# Patient Record
Sex: FEMALE | Race: WHITE | Employment: FULL TIME | URBAN - METROPOLITAN AREA
[De-identification: names, ages, dates, MRNs, and addresses within clinical notes are randomized per-mention and may not be internally consistent; named-entity substitution may affect disease eponyms.]

---

## 2019-02-26 ENCOUNTER — APPOINTMENT (OUTPATIENT)
Dept: GENERAL RADIOLOGY | Age: 54
End: 2019-02-26
Attending: EMERGENCY MEDICINE
Payer: COMMERCIAL

## 2019-02-26 ENCOUNTER — APPOINTMENT (OUTPATIENT)
Dept: CT IMAGING | Age: 54
End: 2019-02-26
Attending: EMERGENCY MEDICINE
Payer: COMMERCIAL

## 2019-02-26 ENCOUNTER — HOSPITAL ENCOUNTER (EMERGENCY)
Age: 54
Discharge: HOME | End: 2019-02-26
Attending: EMERGENCY MEDICINE
Payer: COMMERCIAL

## 2019-02-26 VITALS
TEMPERATURE: 98.7 F | HEART RATE: 64 BPM | SYSTOLIC BLOOD PRESSURE: 177 MMHG | OXYGEN SATURATION: 100 % | RESPIRATION RATE: 18 BRPM | DIASTOLIC BLOOD PRESSURE: 86 MMHG

## 2019-02-26 DIAGNOSIS — S20.221A CONTUSION OF RIGHT BACK WALL OF THORAX, INITIAL ENCOUNTER: ICD-10-CM

## 2019-02-26 DIAGNOSIS — S09.90XA INJURY OF HEAD, INITIAL ENCOUNTER: Primary | ICD-10-CM

## 2019-02-26 DIAGNOSIS — S42.035A NONDISPLACED FRACTURE OF LATERAL END OF LEFT CLAVICLE, INITIAL ENCOUNTER FOR CLOSED FRACTURE: ICD-10-CM

## 2019-02-26 DIAGNOSIS — Z23 REQUIRES A BOOSTER TETANUS: ICD-10-CM

## 2019-02-26 DIAGNOSIS — S40.012A CONTUSION OF LEFT SHOULDER, INITIAL ENCOUNTER: ICD-10-CM

## 2019-02-26 DIAGNOSIS — I10 HYPERTENSION, UNSPECIFIED TYPE: ICD-10-CM

## 2019-02-26 DIAGNOSIS — S01.01XA LACERATION OF SCALP, INITIAL ENCOUNTER: ICD-10-CM

## 2019-02-26 DIAGNOSIS — S50.01XA CONTUSION OF RIGHT ELBOW, INITIAL ENCOUNTER: ICD-10-CM

## 2019-02-26 PROCEDURE — 77030008460 HC STPLR SKN PRECIS 3M -A

## 2019-02-26 PROCEDURE — 74011250637 HC RX REV CODE- 250/637: Performed by: EMERGENCY MEDICINE

## 2019-02-26 PROCEDURE — 71045 X-RAY EXAM CHEST 1 VIEW: CPT

## 2019-02-26 PROCEDURE — 75810000293 HC SIMP/SUPERF WND  RPR

## 2019-02-26 PROCEDURE — 74011250636 HC RX REV CODE- 250/636: Performed by: EMERGENCY MEDICINE

## 2019-02-26 PROCEDURE — 90715 TDAP VACCINE 7 YRS/> IM: CPT | Performed by: EMERGENCY MEDICINE

## 2019-02-26 PROCEDURE — 90471 IMMUNIZATION ADMIN: CPT

## 2019-02-26 PROCEDURE — 72125 CT NECK SPINE W/O DYE: CPT

## 2019-02-26 PROCEDURE — 99285 EMERGENCY DEPT VISIT HI MDM: CPT

## 2019-02-26 PROCEDURE — 99284 EMERGENCY DEPT VISIT MOD MDM: CPT

## 2019-02-26 PROCEDURE — 77030018836 HC SOL IRR NACL ICUM -A

## 2019-02-26 PROCEDURE — 73030 X-RAY EXAM OF SHOULDER: CPT

## 2019-02-26 RX ORDER — VALSARTAN 40 MG/1
40 TABLET ORAL DAILY
Qty: 10 TAB | Refills: 0 | Status: SHIPPED | OUTPATIENT
Start: 2019-02-26 | End: 2019-03-07

## 2019-02-26 RX ORDER — ACETAMINOPHEN 500 MG
1000 TABLET ORAL
Status: COMPLETED | OUTPATIENT
Start: 2019-02-26 | End: 2019-02-26

## 2019-02-26 RX ORDER — METOPROLOL TARTRATE 25 MG/1
12.5 TABLET, FILM COATED ORAL 2 TIMES DAILY
Qty: 20 TAB | Refills: 0 | Status: SHIPPED | OUTPATIENT
Start: 2019-02-26 | End: 2019-03-21 | Stop reason: SDUPTHER

## 2019-02-26 RX ORDER — ACETAMINOPHEN 325 MG/1
650 TABLET ORAL
Qty: 20 TAB | Refills: 0 | Status: SHIPPED | OUTPATIENT
Start: 2019-02-26 | End: 2019-03-18

## 2019-02-26 RX ORDER — CYCLOBENZAPRINE HCL 5 MG
5 TABLET ORAL
Qty: 12 TAB | Refills: 0 | Status: SHIPPED | OUTPATIENT
Start: 2019-02-26 | End: 2019-03-10

## 2019-02-26 RX ORDER — IBUPROFEN 600 MG/1
600 TABLET ORAL
Status: COMPLETED | OUTPATIENT
Start: 2019-02-26 | End: 2019-02-26

## 2019-02-26 RX ORDER — IBUPROFEN 600 MG/1
600 TABLET ORAL
Qty: 20 TAB | Refills: 0 | Status: SHIPPED | OUTPATIENT
Start: 2019-02-26 | End: 2019-05-22

## 2019-02-26 RX ADMIN — IBUPROFEN 600 MG: 600 TABLET, FILM COATED ORAL at 08:58

## 2019-02-26 RX ADMIN — ACETAMINOPHEN 1000 MG: 500 TABLET, FILM COATED ORAL at 08:58

## 2019-02-26 RX ADMIN — TETANUS TOXOID, REDUCED DIPHTHERIA TOXOID AND ACELLULAR PERTUSSIS VACCINE, ADSORBED 0.5 ML: 5; 2.5; 8; 8; 2.5 SUSPENSION INTRAMUSCULAR at 08:11

## 2019-02-26 NOTE — DISCHARGE INSTRUCTIONS
Patient Education        Cuts: Care Instructions  Your Care Instructions  A cut can happen anywhere on your body. Stitches, staples, skin adhesives, or pieces of tape called Steri-Strips are sometimes used to keep the edges of a cut together and help it heal. Steri-Strips can be used by themselves or with stitches or staples. Sometimes cuts are left open. If the cut went deep and through the skin, the doctor may have closed the cut in two layers. A deeper layer of stitches brings the deep part of the cut together. These stitches will dissolve and don't need to be removed. The upper layer closure, which could be stitches, staples, Steri-Strips, or adhesive, is what you see on the cut. A cut is often covered by a bandage. The doctor has checked you carefully, but problems can develop later. If you notice any problems or new symptoms, get medical treatment right away. Follow-up care is a key part of your treatment and safety. Be sure to make and go to all appointments, and call your doctor if you are having problems. It's also a good idea to know your test results and keep a list of the medicines you take. How can you care for yourself at home? If a cut is open or closed  · Prop up the sore area on a pillow anytime you sit or lie down during the next 3 days. Try to keep it above the level of your heart. This will help reduce swelling. · Keep the cut dry for the first 24 to 48 hours. After this, you can shower if your doctor okays it. Pat the cut dry. · Don't soak the cut, such as in a bathtub. Your doctor will tell you when it's safe to get the cut wet. · After the first 24 to 48 hours, clean the cut with soap and water 2 times a day unless your doctor gives you different instructions. ? Don't use hydrogen peroxide or alcohol, which can slow healing. ? You may cover the cut with a thin layer of petroleum jelly and a nonstick bandage.   ? If the doctor put a bandage over the cut, put on a new bandage after cleaning the cut or if the bandage gets wet or dirty. · Avoid any activity that could cause your cut to reopen. · Be safe with medicines. Read and follow all instructions on the label. ? If the doctor gave you a prescription medicine for pain, take it as prescribed. ? If you are not taking a prescription pain medicine, ask your doctor if you can take an over-the-counter medicine. If the cut is closed with stitches, staples, or Steri-Strips  · Follow the above instructions for open or closed cuts. · Do not remove the stitches or staples on your own. Your doctor will tell you when to come back to have the stitches or staples removed. · Leave Steri-Strips on until they fall off. If the cut is closed with a skin adhesive  · Follow the above instructions for open or closed cuts. · Leave the skin adhesive on your skin until it falls off on its own. This may take 5 to 10 days. · Do not scratch, rub, or pick at the adhesive. · Do not put the sticky part of a bandage directly on the adhesive. · Do not put any kind of ointment, cream, or lotion over the area. This can make the adhesive fall off too soon. Do not use hydrogen peroxide or alcohol, which can slow healing. When should you call for help? Call your doctor now or seek immediate medical care if:    · You have new pain, or your pain gets worse.     · The skin near the cut is cold or pale or changes color.     · You have tingling, weakness, or numbness near the cut.     · The cut starts to bleed, and blood soaks through the bandage. Oozing small amounts of blood is normal.     · You have trouble moving the area near the cut.     · You have symptoms of infection, such as:  ? Increased pain, swelling, warmth, or redness around the cut.  ? Red streaks leading from the cut.  ? Pus draining from the cut.  ? A fever.    Watch closely for changes in your health, and be sure to contact your doctor if:    · The cut reopens.     · You do not get better as expected. Where can you learn more? Go to http://david-inna.info/. Enter M735 in the search box to learn more about \"Cuts: Care Instructions. \"  Current as of: September 23, 2018  Content Version: 11.9  © 1581-6646 [employer]. Care instructions adapted under license by [employer] (which disclaims liability or warranty for this information). If you have questions about a medical condition or this instruction, always ask your healthcare professional. [place] [address] any warranty or liability for your use of this information. Patient Education        Cuts Closed With Staples: Care Instructions  Your Care Instructions  A cut can happen anywhere on your body. The doctor used staples to close the cut. Staples easily and quickly close a cut, which helps the cut heal.  Sometimes a cut can injure tendons, blood vessels, or nerves. If the cut went deep and through the skin, the doctor may have put in a layer of stitches below the staples. The deeper layer of stitches brings the deep part of the cut together. These stitches will dissolve and don't need to be removed. The staples in the upper layer are what you see on the cut. You may have a bandage. You will need to have the staples removed, usually in 7 to 14 days. The doctor has checked you carefully, but problems can develop later. If you notice any problems or new symptoms, get medical treatment right away. Follow-up care is a key part of your treatment and safety. Be sure to make and go to all appointments, and call your doctor if you are having problems. It's also a good idea to know your test results and keep a list of the medicines you take. How can you care for yourself at home? · Keep the cut dry for the first 24 to 48 hours. After this, you can shower if your doctor okays it. Pat the cut dry. · Don't soak the cut, such as in a bathtub.  Your doctor will tell you when it's safe to get the cut wet.  · If your doctor told you how to care for your cut, follow your doctor's instructions. If you did not get instructions, follow this general advice:  ? After the first 24 to 48 hours, wash around the cut with clean water 2 times a day. Don't use hydrogen peroxide or alcohol, which can slow healing. ? You may cover the cut with a thin layer of petroleum jelly, such as Vaseline, and a nonstick bandage. ? Apply more petroleum jelly and replace the bandage as needed. · Avoid any activity that could cause your cut to reopen. · Do not remove the staples on your own. Your doctor will tell you when to come back to have the staples removed. · Take pain medicines exactly as directed. ? If the doctor gave you a prescription medicine for pain, take it as prescribed. ? If you are not taking a prescription pain medicine, ask your doctor if you can take an over-the-counter medicine. When should you call for help? Call your doctor now or seek immediate medical care if:    · You have new pain, or your pain gets worse.     · The skin near the cut is cold or pale or changes color.     · You have tingling, weakness, or numbness near the cut.     · The cut starts to bleed, and blood soaks through the bandage. Oozing small amounts of blood is normal.     · You have trouble moving the area near the cut.     · You have symptoms of infection, such as:  ? Increased pain, swelling, warmth, or redness around the cut.  ? Red streaks leading from the cut.  ? Pus draining from the cut.  ? A fever.    Watch closely for changes in your health, and be sure to contact your doctor if:    · You do not get better as expected. Where can you learn more? Go to http://david-inna.info/. Enter G958 in the search box to learn more about \"Cuts Closed With Staples: Care Instructions. \"  Current as of: September 23, 2018  Content Version: 11.9  © 0486-2820 [employer].  Care instructions adapted under license by [employer] (which disclaims liability or warranty for this information). If you have questions about a medical condition or this instruction, always ask your healthcare professional. [address] any warranty or liability for your use of this information.

## 2019-02-26 NOTE — ED PROVIDER NOTES
EMERGENCY DEPARTMENT HISTORY AND PHYSICAL EXAM 
 
7:04 AM 
 
 
Date: 2/26/2019 Patient Name: Andi Crystal History of Presenting Illness Chief Complaint Patient presents with  Laceration  Shoulder Pain History Provided By: Patient Additional History (Context): Andi Crystal is a 52 y.o. female with hypertension who presents per EMS with head laceration occurring minutes PTA. Pt reports a heavy, metal wall locker fell on her at work, causing head and shoulder injury. Associated sx include L shoulder pain, back pain. Pt denies LOC, numbness, weakness. Pt unsure of last tetanus. /91 with hx HTN. PCP: None Chief Complaint: Wound Duration:  Minutes Timing:  Acute Location: Head Quality: laceration Severity: N/A as complaint is not pain Modifying Factors: none reported Associated Symptoms: shoulder pain Past History Past Medical History: 
Past Medical History:  
Diagnosis Date  Hypertension  Kidney calculus Past Surgical History: 
History reviewed. No pertinent surgical history. Family History: 
History reviewed. No pertinent family history. Social History: 
Social History Tobacco Use  Smoking status: Never Smoker  Smokeless tobacco: Never Used Substance Use Topics  Alcohol use: No  
  Frequency: Never  Drug use: No  
 
 
Allergies: 
No Known Allergies Review of Systems Review of Systems Constitutional: Negative for activity change, fatigue and fever. HENT: Negative for congestion and rhinorrhea. Eyes: Negative for visual disturbance. Respiratory: Negative for shortness of breath. Cardiovascular: Negative for chest pain and palpitations. Gastrointestinal: Negative for abdominal pain, diarrhea, nausea and vomiting. Genitourinary: Negative for dysuria and hematuria. Musculoskeletal: Positive for arthralgias (shoulder) and back pain. Skin: Positive for wound. Negative for rash. Neurological: Negative for dizziness, syncope, weakness, light-headedness and numbness. Psychiatric/Behavioral: Negative for agitation. All other systems reviewed and are negative. Physical Exam  
 
Visit Vitals /86 Pulse 64 Temp 98.7 °F (37.1 °C) Resp 18 SpO2 100% Physical Exam  
Constitutional: She appears well-developed and well-nourished. No distress. HENT:  
Head: Normocephalic and atraumatic. Right Ear: External ear normal.  
Left Ear: External ear normal.  
Nose: Nose normal.  
Mouth/Throat: Oropharynx is clear and moist.  
Eyes: Conjunctivae and EOM are normal. Pupils are equal, round, and reactive to light. No scleral icterus. Neck: Normal range of motion. Neck supple. No JVD present. No tracheal deviation present. No thyromegaly present. Cardiovascular: Normal rate and regular rhythm. Exam reveals no friction rub. No murmur heard. Nml cap refill Pulmonary/Chest: Effort normal and breath sounds normal. No stridor. She exhibits no tenderness. Abdominal: Soft. Bowel sounds are normal. She exhibits no distension. There is no tenderness. There is no rebound and no guarding. Musculoskeletal: Normal range of motion. She exhibits no edema or tenderness. No R elbow bony ttp, full ROM, nml cap refill, sensory motor intact LE full ROM, no ttp, motor sensory intact Proximal lateral shoulder ttp, no deformity or step off Lower thorax paraspinal ttp Lymphadenopathy:  
  She has no cervical adenopathy. Neurological: She is alert. She has normal strength. No cranial nerve deficit or sensory deficit. Coordination normal.  
Skin: Skin is warm and dry. R elbow 1 cm abrasion minimal ecchymosis L vertex laceration approx 5cm with no foreign body or active bleeding Psychiatric: She has a normal mood and affect. Her behavior is normal. Judgment and thought content normal.  
Nursing note and vitals reviewed. Diagnostic Study Results Labs - 
 No results found for this or any previous visit (from the past 12 hour(s)). Radiologic Studies -  
CT SPINE CERV WO CONT Final Result IMPRESSION:  No acute osseous injury There is a soft tissue density mass noted inferior to the left thyroid lobe  
which is displacing the trachea to the right. This could be a thyroid mass  
versus adenopathy. Initial workup with thyroid sonography as well as CT of  
either the neck and/or chest would be helpful. XR SHOULDER LT AP/LAT MIN 2 V    As read by Alma Lara MD There is a distal L clavicular fx by [facility] joint. No obvious AC separation. XR CHEST PORT    As read by Alma Lara MD negative for any acute rib fx. Medical Decision Making It should be noted that Candelaria Winslow MD will be the provider of record for this patient. I reviewed the vital signs, available nursing notes, past medical history, past surgical history, family history and social history. Vital Signs-Reviewed the patient's vital signs. Pulse Oximetry Analysis -  99% on room air (Interpretation) wnl 
 
Cardiac Monitor: 
Rate: 64 Records Reviewed: Nursing Notes (Time of Review: 7:04 AM) ED Course: Progress Notes, Reevaluation, and Consults: 
 
10:10 Re-evaluated pt after x-ray, pt is point tender to the L clavicle. Pt. Is fully aware of would check in 2 days and staple removal in 7 days. Pt is fully aware of when to return to ED and also needs close followup. 10:13 Pt also requested home BP medication that she has been out of. Understands to f/u with her PCP, she has 2 different meds that I will prescribe but only for 10 days. Wound Repair 
Date/Time: 2/26/2019 10:01 AM 
Performed by: studentPreparation: skin prepped with Betadine Location details: scalp Wound length:2.6 - 7.5 cm Anesthesia: 
Local Anesthetic: lidocaine 1% without epinephrine Anesthetic total: 4 mL Foreign bodies: no foreign bodies Irrigation solution: saline Irrigation method: syringe Debridement: none Skin closure: staples Number of sutures: 6 (staples) Approximation: close Dressing: antibiotic ointment and 4x4 Patient tolerance: Patient tolerated the procedure well with no immediate complications My total time at bedside, performing this procedure was 1-15 minutes. Provider Notes (Medical Decision Making): Patient presents to emergency department with acute trauma with heavy object to her scalp. No LOC patient did fall down to the mechanism but remembers everything. There is a scalp laceration although there is no significant bruising or contusion around it is no significant active bleeding and there is no foreign body appreciated. Patient also has minimal cervical spine tenderness but due to high be object and the mechanism I will perform CT scan off spine. There is also left shoulder contusion although patient is able to abductor and adductor without problem Patient has full range of motion in her right elbow although there is small bruising present and appreciated. There is no neurological deficits vascular flow is not compromised in any of the extremities. There is some mild right lower thoracic tenderness that is paraspinal.   
Patient did not want any pain medications agrees with all the plan I will get cervical/left shoulder/chest x-ray to look for any acute injuries. If negative symptomatic treatment Follow-up in 48 hours for scalp laceration. It will be irrigated as well and then stapled. Staple removal 7-8 days based on patient's healing. Diagnosis Clinical Impression:  
1. Injury of head, initial encounter 2. Laceration of scalp, initial encounter 3. Contusion of right elbow, initial encounter 4. Contusion of left shoulder, initial encounter 5. Contusion of right back wall of thorax, initial encounter 6. Requires a booster tetanus 7.  Nondisplaced fracture of lateral end of left clavicle, initial encounter for closed fracture 8. Hypertension, unspecified type Disposition: Discharge Follow-up Information Follow up With Specialties Details Why Contact [facility] EMERGENCY DEPT Emergency Medicine  If symptoms worsen [address] 
[phone] Mark Patel MD Orthopedic Surgery Call in 1 day Follow Up From Emergency Department [address] [facility] [address] 
[phone] [address] Call in 1 day Follow Up From Emergency Department [facility] [facility] [address]) [address] [phone] Medication List  
  
START taking these medications   
acetaminophen 325 mg tablet Commonly known as:  TYLENOL Take 2 Tabs by mouth every four (4) hours as needed for Pain for up to 20 days. cyclobenzaprine 5 mg tablet Commonly known as:  FLEXERIL Take 1 Tab by mouth three (3) times daily as needed for Muscle Spasm(s) for up to 12 days. ibuprofen 600 mg tablet Commonly known as:  MOTRIN Take 1 Tab by mouth every six (6) hours as needed for Pain. 
  
metoprolol tartrate 25 mg tablet Commonly known as:  LOPRESSOR Take 0.5 Tabs by mouth two (2) times a day. valsartan 40 mg tablet Commonly known as:  DIOVAN Take 1 Tab by mouth daily for 10 days. Where to Get Your Medications Information about where to get these medications is not yet available Ask your nurse or doctor about these medications · acetaminophen 325 mg tablet · cyclobenzaprine 5 mg tablet · ibuprofen 600 mg tablet · metoprolol tartrate 25 mg tablet · valsartan 40 mg tablet 
  
 
_______________________________ Scribe Attestation Joseph Yoon acting as a scribe for and in the presence of Rudy Mejia MD     
February 26, 2019 at 10:56 AM 
    
Provider Attestation:     
I personally performed the services described in the documentation, reviewed the documentation, as recorded by the scribe in my presence, and it accurately and completely records my words and actions. February 26, 2019 at 10:56 AM - Benjamin Winter MD   
 
 
_______________________________

## 2019-02-26 NOTE — LETTER
NOTIFICATION RETURN TO WORK / SCHOOL 
 
2/26/2019 10:22 AM 
 
Ms. Ulysses Chamberlain [address] [facility]. [address] [facility] [address] [id number] To Whom It May Concern: 
 
Ulysses Chamberlain is currently under the care of [facility] EMERGENCY DEPT. She will return to work/school on: 2/28/2019 If there are questions or concerns please have the patient contact our office. Sincerely, Chasidy Kaufman MD

## 2019-02-26 NOTE — ED TRIAGE NOTES
Patient comes in by medics for complaints of a locker falling on her, laceration to top of head and complaints of L shoulder pain, denies LOC.

## 2019-03-07 ENCOUNTER — OFFICE VISIT (OUTPATIENT)
Dept: INTERNAL MEDICINE CLINIC | Age: 54
End: 2019-03-07

## 2019-03-07 VITALS
SYSTOLIC BLOOD PRESSURE: 140 MMHG | HEART RATE: 68 BPM | HEIGHT: 65 IN | BODY MASS INDEX: 27.96 KG/M2 | WEIGHT: 167.8 LBS | RESPIRATION RATE: 18 BRPM | TEMPERATURE: 95.6 F | OXYGEN SATURATION: 95 % | DIASTOLIC BLOOD PRESSURE: 80 MMHG

## 2019-03-07 DIAGNOSIS — I10 BENIGN HYPERTENSION WITHOUT CHF: ICD-10-CM
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
## 2019-03-07 DIAGNOSIS — Z00.00 ROUTINE GENERAL MEDICAL EXAMINATION AT A HEALTH CARE FACILITY: ICD-10-CM

## 2019-03-07 RX ORDER — TRAMADOL HYDROCHLORIDE 50 MG/1
TABLET ORAL
Refills: 0 | COMMUNITY
Start: 2019-03-05 | End: 2019-03-21

## 2019-03-07 RX ORDER — HYDROCHLOROTHIAZIDE 25 MG/1
25 TABLET ORAL DAILY
Qty: 30 TAB | Refills: 3 | Status: SHIPPED | OUTPATIENT
Start: 2019-03-07 | End: 2019-03-21 | Stop reason: SDUPTHER

## 2019-03-07 NOTE — PROGRESS NOTES
Chief Complaint   Patient presents with    Establish Care    Head Injury     approx 5-6 staples to head, 1 wk old. HPI:     Yoel Fletcher is a 52 y.o.  female with history of hypertension and GERD  Here for the above complaint. Pt went to [facility] ER on 2/26/19 for fall. She had wall lockers fall on her left shoulder has she was putting shoes on. She had a laceration on top of head on the left and fracture of clavicle on left. She denies any chest pain, shortness of breath, abdominal pain, headaches or dizziness. 6 staples were placed. ER wanted her to be removed after 7 days. ER records were reviewed. Result Information     Status: Final result (Exam End: 2/26/2019 07:53) Provider Status: Open   Study Result     EXAM: CT SPINE CERV WO CONT     INDICATION: C-spine trauma, NEXUS/CCR positive     COMPARISON: Status post head injury     TECHNIQUE:  Unenhanced multislice helical CT of the cervical spine was performed  in the axial plane. Sagittal and coronal reconstructions were obtained. CT  dose reduction was achieved through use of a standardized protocol tailored for  this examination and automatic exposure control for dose modulation.      FINDINGS:     The prevertebral body heights are preserved. The vertebra appear in normal  alignment. Anterior disc osteophytes are present at C5-6 and C6-7. Left-sided  facet arthropathy is seen at C3-4 and C4-5. Right-sided facet arthropathy is  seen predominantly at C4-5.      There is a soft tissue density structure noted inferior to the left thyroid lobe  which appears to represent either a thyroid mass or area of adenopathy.  This  structure measures 3.1 x 3.3 x 2.2 cm     C2-C3:  The spinal canal and neural foramina are widely patent.     C3-C4:  The spinal canal and neural foramina are widely patent.     C4-C5:  The spinal canal and neural foramina are widely patent.     C5-C6:  The spinal canal and neural foramina are widely patent.     C6-C7: The spinal canal and neural foramina are widely patent. C7-T1:  The spinal canal and neural foramina are widely patent.     IMPRESSION  IMPRESSION:  No acute osseous injury       There is a soft tissue density mass noted inferior to the left thyroid lobe  which is displacing the trachea to the right. This could be a thyroid mass  versus adenopathy. Initial workup with thyroid sonography as well as CT of  either the neck and/or chest would be helpful. Result Information     Status: Final result (Exam End: 2/26/2019 08:43) Provider Status: Reviewed   Study Result     EXAM:  LEFT SHOULDER:     CLINICAL INDICATION/HISTORY: Left shoulder pain.    > Additional: None.     COMPARISON: None. > Reference Exam: None.     TECHNIQUE: 4 views.     _____________________     FINDINGS:       Distal left clavicular fracture. No significant displacement or angulation. No  lateral humeral dislocation. No acromioclavicular separation.     _____________________     IMPRESSION  IMPRESSION:     Nondisplaced distal left clavicular fracture. Status: Final result (Exam End: 2/26/2019 08:42) Provider Status: Reviewed   Study Result     EXAM:  PORTABLE CHEST     INDICATION: Trauma. Chest pain.     TECHNIQUE:  Portable, erect AP view.     COMPARISON:  None.     ____________________     FINDINGS:       SUPPORT DEVICES: None.     HEART AND MEDIASTINUM: Cardiomediastinal silhouette appears within normal  limits. Normal caliber thoracic aorta.     LUNGS AND PLEURAL SPACES: Lungs are hypoinflated. No convincing focal  consolidation. Although suggestion of retrocardiac opacity, not confirmed on  accompanying left shoulder series. No pulmonary edema or pneumothorax.     BONY THORAX AND SOFT TISSUES: Distal left clavicle fracture, more apparent on  accompanying left shoulder series.     ____________________     IMPRESSION  IMPRESSION:     1. Hypoinflation without convincing acute cardiopulmonary disease.     2.  Nondisplaced distal left clavicular fracture, more apparent on accompanying  left shoulder series. She said she was unaware about the mass below thyroid. Past Medical History:   Diagnosis Date    Anemia     Exposure to TB 1989    positive PPD c prophylaxis tx    GERD (gastroesophageal reflux disease)     Hypertension     Kidney calculus 2014    Positive PPD 1989    c prophylactic tx       History reviewed. No pertinent surgical history. MEDICATION ALLERGIES/INTOLERANCES:   No Known Allergies          CURRENT MEDICATIONS:    Current Outpatient Medications   Medication Sig    traMADol (ULTRAM) 50 mg tablet TK 1 TABLET PO Q 6 H PRF PAIN    hydroCHLOROthiazide (HYDRODIURIL) 25 mg tablet Take 1 Tab by mouth daily.  ibuprofen (MOTRIN) 600 mg tablet Take 1 Tab by mouth every six (6) hours as needed for Pain.  acetaminophen (TYLENOL) 325 mg tablet Take 2 Tabs by mouth every four (4) hours as needed for Pain for up to 20 days.  cyclobenzaprine (FLEXERIL) 5 mg tablet Take 1 Tab by mouth three (3) times daily as needed for Muscle Spasm(s) for up to 12 days.  metoprolol tartrate (LOPRESSOR) 25 mg tablet Take 0.5 Tabs by mouth two (2) times a day. (Patient taking differently: Take 25 mg by mouth daily.)     No current facility-administered medications for this visit.         Health Maintenance   Topic Date Due    Hepatitis C Screening  1965    PAP AKA CERVICAL CYTOLOGY  04/15/1984    Shingrix Vaccine Age 50> (1 of 2) 04/15/2013    BREAST CANCER SCRN MAMMOGRAM  04/15/2013    FOBT Q 1 YEAR AGE 50-75  04/15/2013    Influenza Age 9 to Adult  09/01/2019 (Originally 8/1/2018)    DTaP/Tdap/Td series (2 - Td) 02/26/2029         FAMILY HISTORY:   Family History   Problem Relation Age of Onset    Liver Disease Father         cirrhosis r/t etoh    Alcohol abuse Father     No Known Problems Sister     No Known Problems Brother     Cancer Maternal Aunt         lung ca    Cancer Maternal Uncle         lung ca   Basim Jack Cancer Paternal Grandfather         lung ca    No Known Problems Sister     No Known Problems Brother        SOCIAL HISTORY:   She  reports that  has never smoked. she has never used smokeless tobacco.  She  reports that she drinks alcohol. OBJECTIVE:  PHYSICAL EXAM: Vitals:   Vitals:    03/07/19 1332 03/07/19 1355 03/07/19 1420   BP: (!) 182/92 (!) 186/113 140/80   Pulse: 68     Resp: 18     Temp: 95.6 °F (35.3 °C)     TempSrc: Oral     SpO2: 95%     Weight: 167 lb 12.8 oz (76.1 kg)     Height: 5' 5\" (1.651 m)       Generally: Pleasant female in no acute distress    HEENT exam: Head: atraumatic : 6 stitches on left side of head. Area looks well healed and scabbed over. Pulmonary exam: Clear to ausculation bilaterally    Abdominal exam: Positive bowel sounds in all four quadrants, soft, nondistended, nontender. No hepatosplenomegaly. Extremities: 2+ dorsalis pedis bilaterally. No pedal edema bilaterally. 6 staples removed on left side of head. 4 cm scar. LABS/RADIOLOGICAL TESTS:   No results found for: WBC, HGB, HCT, PLT, HGBEXT, HCTEXT, PLTEXT, HGBEXT, HCTEXT, PLTEXT  No results found for: NA, K, CL, CO2, GLU, BUN, CREA  No results found for: CHOL, CHOLX, CHLST, CHOLV, HDL, LDL, LDLC, DLDLP, TGLX, TRIGL, TRIGP  No results found for: GPT    All lab results and radiological studies were discussed and reviewed with the patient. ASSESSMENT/PLAN:  1. Fall initial encounter: resolved. She has some swelling still on right side of head. No TTP. She has some neck pain. No TTP in the neck area and she was told to use heating pad, icy/hot, tiger balm for pain. ICD-10-CM ICD-9-CM    1. Benign hypertension without CHF I10 401.1 Continue the lopressor. Stop diovan as this is on recall. Will increase to HCTZ 25mg one po daily. Increase high potassium foods while on the medication. hydroCHLOROthiazide (HYDRODIURIL) 25 mg tablet   2.  Mass R22.9 782.2 US THYROID/PARATHYROID/SOFT TISS CT NECK SOFT TISSUE W WO CONT   3. Routine general medical examination at a health care facility Z00.00 V70.0 TSH 3RD GENERATION      CBC W/O DIFF      METABOLIC PANEL, COMPREHENSIVE      LIPID PANEL      HEMOGLOBIN A1C WITH EAG      URINALYSIS W/ RFLX MICROSCOPIC      VITAMIN D, 25 HYDROXY     4. Requested Prescriptions     Signed Prescriptions Disp Refills    hydroCHLOROthiazide (HYDRODIURIL) 25 mg tablet 30 Tab 3     Sig: Take 1 Tab by mouth daily. 5. Patient verbalized understanding and agreement with the plan. 6. Patient was given after visit summary. 7. Follow-up Disposition:  Return in about 2 weeks (around 3/21/2019) for f/u HTN. or sooner if worsening symptoms.         Luis Odom MD

## 2019-03-07 NOTE — PROGRESS NOTES
ROOM # 1    Amanda Pierce presents today for   Chief Complaint   Patient presents with    Establish Care    Head Injury     approx 5-6 staples to head, 1 wk old. Amanda Pierce preferred language for health care discussion is english/other. Is someone accompanying this pt? no    Is the patient using any DME equipment during [address]? no    Depression Screening:  3 most recent PHQ Screens 3/7/2019   Little interest or pleasure in doing things Not at all   Feeling down, depressed, irritable, or hopeless Not at all   Total Score PHQ 2 0       Learning Assessment:  Learning Assessment 3/7/2019   PRIMARY LEARNER Patient   HIGHEST LEVEL OF EDUCATION - PRIMARY LEARNER  [id number] Mike Mullins PRIMARY LEARNER NONE   CO-LEARNER CAREGIVER No   PRIMARY LANGUAGE ENGLISH   LEARNER PREFERENCE PRIMARY READING   ANSWERED BY patient   RELATIONSHIP SELF       Abuse Screening:  Abuse Screening Questionnaire 3/7/2019   Do you ever feel afraid of your partner? N   Are you in a relationship with someone who physically or mentally threatens you? N   Is it safe for you to go home? Y       Fall Risk  No flowsheet data found. Health Maintenance reviewed and discussed per provider. Yes    Amanda Pierce is due for   Health Maintenance Due   Topic Date Due    Hepatitis C Screening  1965    PAP AKA CERVICAL CYTOLOGY  04/15/1984    Shingrix Vaccine Age 50> (1 of 2) 04/15/2013    BREAST CANCER SCRN MAMMOGRAM  04/15/2013    FOBT Q 1 YEAR AGE 50-75  04/15/2013   HM to be d/w provider   Pt. Needs referral to OBGYN for pap smear     Please order/place referral if appropriate. Advance Directive:  1. Do you have an advance directive in place? Patient Reply: no    2. If not, would you like material regarding how to put one in place? Patient Reply: yes, given    Coordination of Care:  1. Have you been to the ER, urgent care clinic since your last visit? Hospitalized since your last visit?  2/26/19 [facility] for head, shoulder and back injury    2. Have you seen or consulted any other health care providers outside of the [address] [place] [address] since your last visit? Include any pap smears or colon screening.  no

## 2019-03-07 NOTE — PATIENT INSTRUCTIONS
1) Increase high potassium foods while on HCTZ. 2) Follow-up in 2 weeks or sooner if worsening symptoms. 3) Stop the valsartan. Potassium-Rich Diet: Care Instructions  Your Care Instructions    Potassium is a mineral. It helps keep the right mix of fluids in your body. It also helps your nerves and muscles work as they should. You'll find it in milk and meats. It's also in all fresh foods, including fruits and vegetables. Most adults need about 5 grams of potassium a day. The foods you eat should supply all that you need. Some health conditions can cause a loss of potassium. For example, kidney problems and stomach problems with vomiting and diarrhea can cause you to lose this mineral. Some medicines, such as water pills (diuretics), can cause low potassium. If you can't get enough potassium from what you eat, your doctor may advise you to take supplements. Follow-up care is a key part of your treatment and safety. Be sure to make and go to all appointments, and call your doctor if you are having problems. It's also a good idea to know your test results and keep a list of the medicines you take. How can you care for yourself at home? · Plan your diet around foods that are rich in potassium. Fresh, unprocessed whole foods have the most. These foods include:  ? Milk and other dairy products. ? Vegetables, especially broccoli, cooked dry beans, tomatoes, potatoes, artichokes, winter squash, and spinach. ? Fruits, especially citrus fruits, bananas, and apricots. Dried apricots contain more potassium than fresh apricots. ? Meat, poultry, and fish. · Ask your doctor about using a salt substitute or \"light\" salt. These often contain potassium. Where can you learn more? Go to http://david-inna.info/. Enter H315 in the search box to learn more about \"Potassium-Rich Diet: Care Instructions. \"  Current as of: March 28, 2018  Content Version: 11.9  © 5099-9486 [employer]. Care instructions adapted under license by [employer] (which disclaims liability or warranty for this information). If you have questions about a medical condition or this instruction, always ask your healthcare professional. [address] any warranty or liability for your use of this information.

## 2019-03-12 ENCOUNTER — HOSPITAL ENCOUNTER (OUTPATIENT)
Dept: ULTRASOUND IMAGING | Age: 54
Discharge: HOME | End: 2019-03-12
Attending: INTERNAL MEDICINE
Payer: COMMERCIAL

## 2019-03-12 ENCOUNTER — HOSPITAL ENCOUNTER (OUTPATIENT)
Dept: CT IMAGING | Age: 54
Discharge: HOME | End: 2019-03-12
Attending: INTERNAL MEDICINE
Payer: COMMERCIAL

## 2019-03-12 LAB — CREAT UR-MCNC: 1.1 MG/DL (ref 0.6–1.3)

## 2019-03-12 PROCEDURE — 70491 CT SOFT TISSUE NECK W/DYE: CPT

## 2019-03-12 PROCEDURE — 76536 US EXAM OF HEAD AND NECK: CPT

## 2019-03-12 PROCEDURE — 74011636320 HC RX REV CODE- 636/320: Performed by: INTERNAL MEDICINE

## 2019-03-12 PROCEDURE — 82565 ASSAY OF CREATININE: CPT

## 2019-03-12 RX ADMIN — IOPAMIDOL 77 ML: 612 INJECTION, SOLUTION INTRAVENOUS at 17:58

## 2019-03-13 DIAGNOSIS — E07.9 THYROID MASS: Primary | ICD-10-CM

## 2019-03-13 NOTE — PROGRESS NOTES
Called pt again and went to . Left message again to call the office back at [phone] regarding her results.

## 2019-03-13 NOTE — PROGRESS NOTES
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 3) see result note for thyroid ultrasound    Called pt and left message on VM to call the office back at [phone] regarding her neck CT and thyroid ultrasound results.

## 2019-03-14 NOTE — PROGRESS NOTES
Incoming call from pt. 2 pt identifiers confirmed. Pt informed of below per Dr Andreea Cuevas. Pt verbalized understanding. No other questions or concerns at this time.

## 2019-03-16 ENCOUNTER — OFFICE VISIT (OUTPATIENT)
Dept: INTERNAL MEDICINE CLINIC | Age: 54
End: 2019-03-16

## 2019-03-16 VITALS
HEART RATE: 64 BPM | OXYGEN SATURATION: 99 % | RESPIRATION RATE: 16 BRPM | WEIGHT: 173 LBS | BODY MASS INDEX: 28.82 KG/M2 | TEMPERATURE: 97.6 F | DIASTOLIC BLOOD PRESSURE: 95 MMHG | HEIGHT: 65 IN | SYSTOLIC BLOOD PRESSURE: 144 MMHG

## 2019-03-16 DIAGNOSIS — J00 ACUTE RHINITIS: Primary | ICD-10-CM

## 2019-03-16 DIAGNOSIS — R09.81 CONGESTION OF NASAL SINUS: ICD-10-CM

## 2019-03-16 RX ORDER — VALSARTAN 40 MG/1
TABLET ORAL
Refills: 0 | COMMUNITY
Start: 2019-02-26 | End: 2019-03-21

## 2019-03-16 NOTE — PROGRESS NOTES
HISTORY OF PRESENT ILLNESS  Soraida Street is a 52 y.o. female. Sinus Pain    Associated symptoms include cough. Cough     Generalized Body Aches         Review of Systems   HENT: Positive for sinus pain. Respiratory: Positive for cough.         Physical Exam    ASSESSMENT and PLAN  {ASSESSMENT/PLAN:12495}

## 2019-03-16 NOTE — PATIENT INSTRUCTIONS
Saline Nasal Washes: Care Instructions  Your Care Instructions  Saline nasal washes help keep the nasal passages open by washing out thick or dried mucus. This simple remedy can help relieve symptoms of allergies, sinusitis, and colds. It also can make the nose feel more comfortable by keeping the mucous membranes moist. You may notice a little burning sensation in your nose the first few times you use the solution, but this usually gets better in a few days. Follow-up care is a key part of your treatment and safety. Be sure to make and go to all appointments, and call your doctor if you are having problems. It's also a good idea to know your test results and keep a list of the medicines you take. How can you care for yourself at home? · You can buy premixed saline solution in a squeeze bottle or other sinus rinse products at a drugstore. Read and follow the instructions on the label. · You also can make your own saline solution by adding 1 teaspoon of salt and 1 teaspoon of baking soda to 2 cups of distilled water. · If you use a homemade solution, pour a small amount into a clean bowl. Using a rubber bulb syringe, squeeze the syringe and place the tip in the salt water. Pull a small amount of the salt water into the syringe by relaxing your hand. · Sit down with your head tilted slightly back. Do not lie down. Put the tip of the bulb syringe or the squeeze bottle a little way into one of your nostrils. Gently drip or squirt a few drops into the nostril. Repeat with the other nostril. Some sneezing and gagging are normal at first.  · Gently blow your nose. · Wipe the syringe or bottle tip clean after each use. · Repeat this 2 or 3 times a day. · Use nasal washes gently if you have nosebleeds often. When should you call for help? Watch closely for changes in your health, and be sure to contact your doctor if:    · You often get nosebleeds.     · You have problems doing the nasal washes.    Where can you learn more? Go to http://david-inna.info/. Enter [id number] [id number] [id number] in the search box to learn more about \"Saline Nasal Washes: Care Instructions. \"  Current as of: March 27, 2018  Content Version: 11.9  © 5677-8768 [employer]. Care instructions adapted under license by [employer] (which disclaims liability or warranty for this information). If you have questions about a medical condition or this instruction, always ask your healthcare professional. [address] any warranty or liability for your use of this information.

## 2019-03-16 NOTE — PROGRESS NOTES
SUBJECTIVE:   HPI:  Nusrat Bravo is a 52 y.o. female who complains of cough, congestion, sneezing x 2 days. Reports feeling feverish/chills. No body aches. No N/V/D. Feels a little better over the last few hours. Patient reports using Tylenol, Corcidin, alkaselzer -D, Robitussin. No sick contacts. Recent travel to [place] (symptoms started there on Thursday). ROS:    · General: No fever, chills; no weight weight loss, no night sweats  · Respiratory: No cough, No shortness of breath, No wheezing  · Cardiovascular: No chest pain, No palpitations, No dyspnea on exertion  · Gastrointestinal: No nausea, no vomiting, no diarrhea, no constipation, no black or bloody stools  · Urinary: No dysuria, no urgency, no frequency, no blood, no discharge  · Musculoskeletal: no muscle weakness, no muscles pain  · Neurological: No dizziness, no headaches, no numbness/tingling of extremities    Past Medical History:   Diagnosis Date    Anemia     Exposure to TB 1989    positive PPD c prophylaxis tx    GERD (gastroesophageal reflux disease)     Hypertension     Kidney calculus 2014    Positive PPD 1989    c prophylactic tx     History reviewed. No pertinent surgical history. Outpatient Medications Marked as Taking for the 3/16/19 encounter (Office Visit) with Eladia Cardona, DO   Medication Sig Dispense Refill    traMADol (ULTRAM) 50 mg tablet TK 1 TABLET PO Q 6 H PRF PAIN  0    hydroCHLOROthiazide (HYDRODIURIL) 25 mg tablet Take 1 Tab by mouth daily. 30 Tab 3    ibuprofen (MOTRIN) 600 mg tablet Take 1 Tab by mouth every six (6) hours as needed for Pain. 20 Tab 0    acetaminophen (TYLENOL) 325 mg tablet Take 2 Tabs by mouth every four (4) hours as needed for Pain for up to 20 days. 20 Tab 0    metoprolol tartrate (LOPRESSOR) 25 mg tablet Take 0.5 Tabs by mouth two (2) times a day.  (Patient taking differently: Take 25 mg by mouth daily.) 20 Tab 0     No Known Allergies    OBJECTIVE:  Visit Vitals  BP (!) 144/95 (BP 1 Location: Right arm, BP Patient Position: Sitting)   Pulse 64   Temp 97.6 °F (36.4 °C) (Oral)   Resp 16   Ht 5' 5\" (1.651 m)   Wt 173 lb (78.5 kg)   SpO2 99%   BMI 28.79 kg/m²      GEN: awake, alert, orientated, in no acute distress  EARS: clear canals, TMs show no erythema  NOSE: clear drainage, +edema and pale,bogginess of turbinates  SINUSES: non-tender  THROAT: clear post nasal drip, no erythema, no exudate  NECK: No lymphadenopathy, normal appearing thyroid  CV:  The heart sounds are regular in rate and rhythm. There is a normal S1 and S2. There or no murmurs, rubs, or gallops. Distal pulses are intact and equal. No peripheral edema. LUNGS:  Lung sounds are clear and equal to auscultation throughout all lung fields. NEURO: CNII-XII grossly intact, normal balance, normal gait    ASSESSMENT/PLAN:     1. Acute rhinitis - suspect allergic as main cause. Symptomatic relief for now. Drink plenty of water to keep urine clear. Recommend daily saline sinus rinses. May use OTC decongestants, Flonase and pain relievers as needed. Return if symptoms worsen or do not improve. 2. Congestion of nasal sinus - same as above.

## 2019-03-16 NOTE — PROGRESS NOTES
ROOM # 18    Patricia Espinoza presents today for   Chief Complaint   Patient presents with    Sinus Pain    Cough    Generalized Body Aches       Patricia Espinoza preferred language for health care discussion is english/other. Is someone accompanying this pt? No    Is the patient using any DME equipment during OV? No    Depression Screening:  3 most recent PHQ Screens 3/16/2019 3/7/2019   Little interest or pleasure in doing things Not at all Not at all   Feeling down, depressed, irritable, or hopeless Not at all Not at all   Total Score PHQ 2 0 0       Learning Assessment:  Learning Assessment 3/7/2019   PRIMARY LEARNER Patient   HIGHEST LEVEL OF EDUCATION - PRIMARY LEARNER  [id number] Mike Mullins PRIMARY LEARNER NONE   CO-LEARNER CAREGIVER No   PRIMARY LANGUAGE ENGLISH   LEARNER PREFERENCE PRIMARY READING   ANSWERED BY patient   RELATIONSHIP SELF       Abuse Screening:  Abuse Screening Questionnaire 3/7/2019   Do you ever feel afraid of your partner? N   Are you in a relationship with someone who physically or mentally threatens you? N   Is it safe for you to go home? Y       Fall Risk  No flowsheet data found. Health Maintenance reviewed and discussed per provider. Yes    Patricia Espinoza is due for   Health Maintenance Due   Topic Date Due    Hepatitis C Screening  1965    PAP AKA CERVICAL CYTOLOGY  04/15/1984    Shingrix Vaccine Age 50> (1 of 2) 04/15/2013    BREAST CANCER SCRN MAMMOGRAM  04/15/2013    FOBT Q 1 YEAR AGE 50-75  04/15/2013     Please order/place referral if appropriate. Advance Directive:  1. Do you have an advance directive in place? Patient Reply: No    2. If not, would you like material regarding how to put one in place? Patient Reply: No    Coordination of Care:  1. Have you been to the ER, urgent care clinic since your last visit? Hospitalized since your last visit? No    2.  Have you seen or consulted any other health care providers outside of the [facility] since your last visit? Include any pap smears or colon screening.  No

## 2019-03-19 ENCOUNTER — TELEPHONE (OUTPATIENT)
Dept: INTERNAL MEDICINE CLINIC | Age: 54
End: 2019-03-19

## 2019-03-19 NOTE — PROGRESS NOTES
2 pt. Identifiers confirmed. Pt. Notified of below. As well as US results. Pt. Was given number to ENT as info has already been faxed to them. She believes she may have an aptmt c them on 4/2/19. No other questions/concerns at this time.

## 2019-03-19 NOTE — PROGRESS NOTES
Return call: Unsuccessful attempt to reach pt for results. Left message for her to call back at her earliest convenience.

## 2019-03-21 ENCOUNTER — HOSPITAL ENCOUNTER (OUTPATIENT)
Dept: LAB | Age: 54
Discharge: HOME | End: 2019-03-21
Payer: COMMERCIAL

## 2019-03-21 ENCOUNTER — OFFICE VISIT (OUTPATIENT)
Dept: INTERNAL MEDICINE CLINIC | Age: 54
End: 2019-03-21

## 2019-03-21 VITALS
DIASTOLIC BLOOD PRESSURE: 80 MMHG | OXYGEN SATURATION: 99 % | WEIGHT: 171 LBS | BODY MASS INDEX: 28.49 KG/M2 | TEMPERATURE: 95.9 F | SYSTOLIC BLOOD PRESSURE: 130 MMHG | HEART RATE: 60 BPM | HEIGHT: 65 IN | RESPIRATION RATE: 16 BRPM

## 2019-03-21 DIAGNOSIS — Z13.21 ENCOUNTER FOR VITAMIN DEFICIENCY SCREENING: ICD-10-CM

## 2019-03-21 DIAGNOSIS — E07.9 THYROID MASS: ICD-10-CM

## 2019-03-21 DIAGNOSIS — Z00.00 ROUTINE GENERAL MEDICAL EXAMINATION AT A HEALTH CARE FACILITY: ICD-10-CM

## 2019-03-21 DIAGNOSIS — Z11.59 NEED FOR HEPATITIS C SCREENING TEST: ICD-10-CM

## 2019-03-21 DIAGNOSIS — Z12.39 BREAST CANCER SCREENING: ICD-10-CM

## 2019-03-21 DIAGNOSIS — I10 BENIGN HYPERTENSION WITHOUT CHF: Primary | ICD-10-CM

## 2019-03-21 DIAGNOSIS — I10 BENIGN HYPERTENSION WITHOUT CHF: ICD-10-CM

## 2019-03-21 DIAGNOSIS — Z13.1 DIABETES MELLITUS SCREENING: ICD-10-CM

## 2019-03-21 DIAGNOSIS — Z23 ENCOUNTER FOR IMMUNIZATION: ICD-10-CM

## 2019-03-21 LAB
25(OH)D3 SERPL-MCNC: 16.7 NG/ML (ref 30–100)
ALBUMIN SERPL-MCNC: 3.9 G/DL (ref 3.4–5)
ALBUMIN/GLOB SERPL: 1.1 {RATIO} (ref 0.8–1.7)
ALP SERPL-CCNC: 124 U/L (ref 45–117)
ALT SERPL-CCNC: 25 U/L (ref 13–56)
ANION GAP SERPL CALC-SCNC: 8 MMOL/L (ref 3–18)
APPEARANCE UR: CLEAR
AST SERPL-CCNC: 23 U/L (ref 15–37)
BACTERIA URNS QL MICRO: NEGATIVE /HPF
BILIRUB SERPL-MCNC: 0.4 MG/DL (ref 0.2–1)
BILIRUB UR QL: NEGATIVE
BUN SERPL-MCNC: 12 MG/DL (ref 7–18)
BUN/CREAT SERPL: 13 (ref 12–20)
CALCIUM SERPL-MCNC: 10.8 MG/DL (ref 8.5–10.1)
CHLORIDE SERPL-SCNC: 108 MMOL/L (ref 100–108)
CHOLEST SERPL-MCNC: 174 MG/DL
CO2 SERPL-SCNC: 26 MMOL/L (ref 21–32)
COLOR UR: YELLOW
CREAT SERPL-MCNC: 0.91 MG/DL (ref 0.6–1.3)
EPITH CASTS URNS QL MICRO: NORMAL /LPF (ref 0–5)
ERYTHROCYTE [DISTWIDTH] IN BLOOD BY AUTOMATED COUNT: 13.2 % (ref 11.6–14.5)
EST. AVERAGE GLUCOSE BLD GHB EST-MCNC: 108 MG/DL
GLOBULIN SER CALC-MCNC: 3.7 G/DL (ref 2–4)
GLUCOSE SERPL-MCNC: 98 MG/DL (ref 74–99)
GLUCOSE UR STRIP.AUTO-MCNC: NEGATIVE MG/DL
HBA1C MFR BLD: 5.4 % (ref 4.2–5.6)
HCT VFR BLD AUTO: 43.6 % (ref 35–45)
HDLC SERPL-MCNC: 57 MG/DL (ref 40–60)
HDLC SERPL: 3.1 {RATIO} (ref 0–5)
HGB BLD-MCNC: 13.8 G/DL (ref 12–16)
HGB UR QL STRIP: NEGATIVE
KETONES UR QL STRIP.AUTO: NEGATIVE MG/DL
LDLC SERPL CALC-MCNC: 97.8 MG/DL (ref 0–100)
LEUKOCYTE ESTERASE UR QL STRIP.AUTO: ABNORMAL
LIPID PROFILE,FLP: NORMAL
MCH RBC QN AUTO: 29.6 PG (ref 24–34)
MCHC RBC AUTO-ENTMCNC: 31.7 G/DL (ref 31–37)
MCV RBC AUTO: 93.4 FL (ref 74–97)
NITRITE UR QL STRIP.AUTO: NEGATIVE
PH UR STRIP: 8.5 [PH] (ref 5–8)
PLATELET # BLD AUTO: 272 K/UL (ref 135–420)
PMV BLD AUTO: 10.8 FL (ref 9.2–11.8)
POTASSIUM SERPL-SCNC: 3.9 MMOL/L (ref 3.5–5.5)
PROT SERPL-MCNC: 7.6 G/DL (ref 6.4–8.2)
PROT UR STRIP-MCNC: NEGATIVE MG/DL
RBC # BLD AUTO: 4.67 M/UL (ref 4.2–5.3)
RBC #/AREA URNS HPF: 0 /HPF (ref 0–5)
SODIUM SERPL-SCNC: 142 MMOL/L (ref 136–145)
SP GR UR REFRACTOMETRY: 1.01 (ref 1–1.03)
TRIGL SERPL-MCNC: 96 MG/DL (ref ?–150)
TSH SERPL DL<=0.05 MIU/L-ACNC: 0.45 UIU/ML (ref 0.36–3.74)
UROBILINOGEN UR QL STRIP.AUTO: 1 EU/DL (ref 0.2–1)
VLDLC SERPL CALC-MCNC: 19.2 MG/DL
WBC # BLD AUTO: 3.7 K/UL (ref 4.6–13.2)
WBC URNS QL MICRO: NORMAL /HPF (ref 0–4)

## 2019-03-21 PROCEDURE — 84443 ASSAY THYROID STIM HORMONE: CPT

## 2019-03-21 PROCEDURE — 83036 HEMOGLOBIN GLYCOSYLATED A1C: CPT

## 2019-03-21 PROCEDURE — 80053 COMPREHEN METABOLIC PANEL: CPT

## 2019-03-21 PROCEDURE — 36415 COLL VENOUS BLD VENIPUNCTURE: CPT

## 2019-03-21 PROCEDURE — 80061 LIPID PANEL: CPT

## 2019-03-21 PROCEDURE — 85027 COMPLETE CBC AUTOMATED: CPT

## 2019-03-21 PROCEDURE — 81001 URINALYSIS AUTO W/SCOPE: CPT

## 2019-03-21 PROCEDURE — 82306 VITAMIN D 25 HYDROXY: CPT

## 2019-03-21 PROCEDURE — 86803 HEPATITIS C AB TEST: CPT

## 2019-03-21 RX ORDER — METOPROLOL TARTRATE 25 MG/1
25 TABLET, FILM COATED ORAL DAILY
Qty: 90 TAB | Refills: 3 | Status: SHIPPED | OUTPATIENT
Start: 2019-03-21

## 2019-03-21 RX ORDER — HYDROCHLOROTHIAZIDE 25 MG/1
25 TABLET ORAL DAILY
Qty: 90 TAB | Refills: 3 | Status: SHIPPED | OUTPATIENT
Start: 2019-03-21

## 2019-03-21 NOTE — PROGRESS NOTES
Chief Complaint   Patient presents with    Results     2 wk f/u       HPI:     Susana Cage is a 52 y.o.  female with history of hypertension and GERD   here for the above complaint. She has a cough and pain in chest from cough. She denies any shortness of breath, abdominal pain, headache or dizziness. She  Came in Saturday and was told to take flonase, saline spray and zyrtec. She is aware of her ENT appt. Past Medical History:   Diagnosis Date    Anemia     Exposure to TB 1989    positive PPD c prophylaxis tx    GERD (gastroesophageal reflux disease)     Hypertension     Kidney calculus 2014    Positive PPD 1989    c prophylactic tx     History reviewed. No pertinent surgical history. Current Outpatient Medications   Medication Sig    hydroCHLOROthiazide (HYDRODIURIL) 25 mg tablet Take 1 Tab by mouth daily.  ibuprofen (MOTRIN) 600 mg tablet Take 1 Tab by mouth every six (6) hours as needed for Pain.  metoprolol tartrate (LOPRESSOR) 25 mg tablet Take 0.5 Tabs by mouth two (2) times a day. (Patient taking differently: Take 25 mg by mouth daily.)     No current facility-administered medications for this visit. Health Maintenance   Topic Date Due    Hepatitis C Screening  1965    PAP AKA CERVICAL CYTOLOGY  04/15/1984    Shingrix Vaccine Age 50> (1 of 2) 04/15/2013    BREAST CANCER SCRN MAMMOGRAM  04/15/2013    FOBT Q 1 YEAR AGE 50-75  04/15/2013    Influenza Age 5 to Adult  09/01/2019 (Originally 8/1/2018)    DTaP/Tdap/Td series (2 - Td) 02/26/2029    Pneumococcal 0-64 years  Aged [employer] History   Administered Date(s) Administered    Tdap 02/26/2019     No LMP recorded. (Menstrual status: Menopause).         Allergies and Intolerances:   No Known Allergies    Family History:   Family History   Problem Relation Age of Onset    Liver Disease Father         cirrhosis r/t etoh    Alcohol abuse Father     No Known Problems Sister     No Known Problems Brother     Cancer Maternal Aunt         lung ca    Cancer Maternal Uncle         lung ca    Cancer Paternal Grandfather         lung ca    No Known Problems Sister     No Known Problems Brother        Social History:   She  reports that she has never smoked. She has never used smokeless tobacco.  She  reports that she drinks alcohol. ·     OBJECTIVE:   Physical exam:   Visit Vitals  /80 (BP 1 Location: Left arm, BP Patient Position: Sitting)   Pulse 60   Temp 95.9 °F (35.5 °C) (Oral)   Resp 16   Ht 5' 5\" (1.651 m)   Wt 171 lb (77.6 kg)   SpO2 99%   BMI 28.46 kg/m²        Generally: Pleasant female in no acute distress  Cardiac Exam: regular, rate, and rhythm. Normal S1 and S2. No murmurs, gallops, or rubs  Pulmonary exam: Clear to auscultation bilaterally  Abdominal exam: Positive bowel sounds in all four quadrants, soft, nondistended, nontender  Extremities: 2+ dorsalis pedis pulses bilaterally. No pedal edema    bilaterally    LABS/RADIOLOGICAL TESTS:  none    ASSESSMENT/PLAN:    1. Benign hypertension without CHF: stable. Continue the HCTZ and lopressor, diet and exercise. 2. Encounter for immunization  -     ZOSTER VACC RECOMBINANT ADJUVANTED    3. Breast cancer screening  -     NUZHAT MAMMO BI SCREENING INCL CAD; Future    4. Routine general medical examination at a health care facility  -     TSH 3RD GENERATION; Future  -     CBC W/O DIFF; Future  -     METABOLIC PANEL, COMPREHENSIVE; Future  -     LIPID PANEL; Future  -     URINALYSIS W/ RFLX MICROSCOPIC; Future    5. Thyroid mass  -     TSH 3RD GENERATION; Future    6. Diabetes mellitus screening  -     HEMOGLOBIN A1C WITH EAG; Future    7. Need for hepatitis C screening test  -     HEPATITIS C AB; Future    8. Encounter for vitamin deficiency screening  -     VITAMIN D, 25 HYDROXY; Future    9. Patient verbalized understanding and agreement with the plan. 10. Patient was given an after-visit summary.     11. Follow-up in 1 month for HTN  or sooner if worsening symptoms.           Lucy Guzman M.D.

## 2019-03-21 NOTE — TELEPHONE ENCOUNTER
Requested Prescriptions     Pending Prescriptions Disp Refills    hydroCHLOROthiazide (HYDRODIURIL) 25 mg tablet 30 Tab 3     Sig: Take 1 Tab by mouth daily.

## 2019-03-21 NOTE — PROGRESS NOTES
ROOM # 1    Sandi Clarke presents today for   Chief Complaint   Patient presents with    Results     2 wk f/u       Sandi Clarke preferred language for health care discussion is english/other. Is someone accompanying this pt? no    Is the patient using any DME equipment during [address]? no    Depression Screening:  3 most recent PHQ Screens 3/16/2019 3/7/2019   Little interest or pleasure in doing things Not at all Not at all   Feeling down, depressed, irritable, or hopeless Not at all Not at all   Total Score PHQ 2 0 0       Learning Assessment:  Learning Assessment 3/7/2019   PRIMARY LEARNER Patient   HIGHEST LEVEL OF EDUCATION - PRIMARY LEARNER  [id number] [address] PRIMARY LEARNER NONE   CO-LEARNER CAREGIVER No   PRIMARY LANGUAGE ENGLISH   LEARNER PREFERENCE PRIMARY READING   ANSWERED BY patient   RELATIONSHIP SELF       Abuse Screening:  Abuse Screening Questionnaire 3/7/2019   Do you ever feel afraid of your partner? N   Are you in a relationship with someone who physically or mentally threatens you? N   Is it safe for you to go home? Y       Fall Risk  No flowsheet data found. Health Maintenance reviewed and discussed per provider. Yes    Sandi Clarke is due for   Health Maintenance Due   Topic Date Due    Hepatitis C Screening  1965    PAP AKA CERVICAL CYTOLOGY  04/15/1984    Shingrix Vaccine Age 50> (1 of 2) 04/15/2013    BREAST CANCER SCRN MAMMOGRAM  04/15/2013    FOBT Q 1 YEAR AGE 50-75  04/15/2013   HM to be d/w provider      Please order/place referral if appropriate. Advance Directive:  1. Do you have an advance directive in place? Patient Reply: no    2. If not, would you like material regarding how to put one in place? Patient Reply: no    Coordination of Care:  1. Have you been to the ER, urgent care clinic since your last visit? Hospitalized since your last visit? no    2.  Have you seen or consulted any other health care providers outside of the [facility] since your last visit? Include any pap smears or colon screening.  no

## 2019-03-22 LAB
HCV AB SER IA-ACNC: 0.05 INDEX
HCV AB SERPL QL IA: NEGATIVE
HCV COMMENT,HCGAC: NORMAL

## 2019-03-25 DIAGNOSIS — E55.9 VITAMIN D DEFICIENCY: Primary | ICD-10-CM

## 2019-03-25 DIAGNOSIS — R82.90 ABNORMAL URINE: Primary | ICD-10-CM

## 2019-03-25 RX ORDER — ERGOCALCIFEROL 1.25 MG/1
CAPSULE ORAL
Qty: 4 CAP | Refills: 0 | Status: SHIPPED | OUTPATIENT
Start: 2019-03-25 | End: 2019-05-22

## 2019-03-25 NOTE — PROGRESS NOTES
Pt contacted at home number. 2 pt identifiers confirmed. Pt informed of below. She states that she does have urinary frequency. Denies any other complaints. Pt verbalized understanding. No other questions at this time. Patient

## 2019-03-25 NOTE — PROGRESS NOTES
Please let pt know that labs were normal except: 
 
1) vitamin D low at 16.7. Will send electronically vitamin D2 50,000 international units  Take one po weekly x 4 weeks #4 no refills. Will recheck levels after 4 weeks. 2) calcium little up at 10.8. Decrease some calcium in diet. We will monitor. 3) Alk phos up at 124. Is she having any RUQ pain, yellowing of eyes or skin, n/v? 
 
4) some wbc's in urine, but no bacteria. Any f/c/ns, dysuria, hematuria, abd pain, increase urgency/frequency, abd pain? 5) WBC low at 3.7. We will monitor.

## 2019-03-26 NOTE — PROGRESS NOTES
Attempted to contact pt at  number, no answer. Lvm for pt to return call to office at [phone]. Will continue to try to contact pt.

## 2019-03-28 NOTE — PROGRESS NOTES
Pt contacted at home number. 2 pt identifiers confirmed. Pt informed of below. She states that she thinks the urinary frequency is due to menopause. It is not alarming to her right now. No other questions at this time.

## 2019-05-22 ENCOUNTER — OFFICE VISIT (OUTPATIENT)
Dept: INTERNAL MEDICINE CLINIC | Age: 54
End: 2019-05-22

## 2019-05-22 VITALS
WEIGHT: 174.4 LBS | DIASTOLIC BLOOD PRESSURE: 98 MMHG | RESPIRATION RATE: 18 BRPM | SYSTOLIC BLOOD PRESSURE: 160 MMHG | TEMPERATURE: 97.5 F | OXYGEN SATURATION: 98 % | HEIGHT: 65 IN | BODY MASS INDEX: 29.06 KG/M2 | HEART RATE: 61 BPM

## 2019-05-22 DIAGNOSIS — I10 BENIGN HYPERTENSION WITHOUT CHF: Primary | ICD-10-CM

## 2019-05-22 NOTE — PATIENT INSTRUCTIONS
1) Follow-up in 3 months or sooner if worsening symptoms. 2) Make sure to take your blood pressure medications every day.

## 2019-05-22 NOTE — PROGRESS NOTES
ROOM # 2  Identified pt with two pt identifiers(name and ). Reviewed record in preparation for visit and have obtained necessary documentation. Chief Complaint   Patient presents with    Hypertension     1 month f/u      Daren Hernadez preferred language for health care discussion is english/other. Is the patient using any DME equipment during OV? Pradeep Guadalupe is due for:  Health Maintenance Due   Topic    BREAST CANCER SCRN MAMMOGRAM     PAP AKA CERVICAL CYTOLOGY     FOBT Q 1 YEAR AGE 54-65     Shingrix Vaccine Age 50> (2 of 2)     Health Maintenance reviewed and discussed per provider  Please order/place referral if appropriate. Advance Directive:  1. Do you have an advance directive in place? Patient Reply: NO    2. If not, would you like material regarding how to put one in place? NO    Coordination of Care:  1. Have you been to the ER, urgent care clinic since your last visit? Hospitalized since your last visit? NO    2. Have you seen or consulted any other health care providers outside of the [address] [place] [address] since your last visit? Include any pap smears or colon screening. NO    Patient is accompanied by self I have received verbal consent from Daren Hernadez to discuss any/all medical information while they are present in the room. Learning Assessment:  Learning Assessment 3/7/2019   PRIMARY LEARNER Patient   HIGHEST LEVEL OF EDUCATION - PRIMARY LEARNER  [employer]   [employer] PRIMARY LEARNER NONE   CO-LEARNER CAREGIVER No   PRIMARY LANGUAGE ENGLISH   LEARNER PREFERENCE PRIMARY READING   ANSWERED BY patient   RELATIONSHIP SELF     Depression Screening:  3 most recent PHQ Screens 3/16/2019 3/7/2019   Little interest or pleasure in doing things Not at all Not at all   Feeling down, depressed, irritable, or hopeless Not at all Not at all   Total Score PHQ 2 0 0     Abuse Screening:  Abuse Screening Questionnaire 3/7/2019   Do you ever feel afraid of your partner?  N   Are you in a relationship with someone who physically or mentally threatens you? N   Is it safe for you to go home?  Y     Fall Risk  n/i

## 2019-05-22 NOTE — PROGRESS NOTES
Chief Complaint   Patient presents with    Hypertension     1 month f/u       HPI:     Maia Temple is a 62 y.o.  female with history of GERD and hypertension  here for the above complaint. She denies any chest pain, shortness of breath, abdominal pain, headaches or dizziness. She is now third shift and sometimes misses dosages of her blood pressure medication. She just got off work. Past Medical History:   Diagnosis Date    Anemia     Exposure to TB 1989    positive PPD c prophylaxis tx    GERD (gastroesophageal reflux disease)     Hypertension     Kidney calculus 2014    Positive PPD 1989    c prophylactic tx    Vitamin D deficiency 03/2019     History reviewed. No pertinent surgical history. Current Outpatient Medications   Medication Sig    hydroCHLOROthiazide (HYDRODIURIL) 25 mg tablet Take 1 Tab by mouth daily.  metoprolol tartrate (LOPRESSOR) 25 mg tablet Take 1 Tab by mouth daily. No current facility-administered medications for this visit. Health Maintenance   Topic Date Due    BREAST CANCER SCRN MAMMOGRAM  04/15/1981    PAP AKA CERVICAL CYTOLOGY  04/15/1984    FOBT Q 1 YEAR AGE 50-75  04/15/2013    Shingrix Vaccine Age 50> (2 of 2) 05/16/2019    Influenza Age 5 to Adult  09/01/2019 (Originally 8/1/2019)    DTaP/Tdap/Td series (2 - Td) 02/26/2029    Hepatitis C Screening  Completed    Pneumococcal 0-64 years  Aged [employer] History   Administered Date(s) Administered    Tdap 02/26/2019    Zoster Recombinant 03/21/2019     No LMP recorded. (Menstrual status: Menopause).         Allergies and Intolerances:   No Known Allergies    Family History:   Family History   Problem Relation Age of Onset    Liver Disease Father         cirrhosis r/t etoh    Alcohol abuse Father     No Known Problems Sister     No Known Problems Brother     Cancer Maternal Aunt         lung ca    Cancer Maternal Uncle         lung ca    Cancer Paternal Grandfather lung ca    No Known Problems Sister     No Known Problems Brother        Social History:   She  reports that she has never smoked. She has never used smokeless tobacco.  She  reports that she drinks alcohol. ·     OBJECTIVE:   Physical exam:   Visit Vitals  BP (!) 160/98 (BP 1 Location: Left arm, BP Patient Position: Sitting) Comment (BP 1 Location): Pt just got off from work and has missed some bp meds. Pulse 61   Temp 97.5 °F (36.4 °C) (Oral)   Resp 18   Ht 5' 5\" (1.651 m)   Wt 174 lb 6.4 oz (79.1 kg)   SpO2 98%   BMI 29.02 kg/m²        Generally: Pleasant female in no acute distress  Cardiac Exam: regular, rate, and rhythm. Normal S1 and S2. No murmurs, gallops, or rubs  Pulmonary exam: Clear to auscultation bilaterally  Abdominal exam: Positive bowel sounds in all four quadrants, soft, nondistended, nontender  Extremities: 2+ dorsalis pedis pulses bilaterally. No pedal edema    bilaterally    LABS/RADIOLOGICAL TESTS:  Lab Results   Component Value Date/Time    WBC 3.7 (L) 03/21/2019 10:15 AM    HGB 13.8 03/21/2019 10:15 AM    HCT 43.6 03/21/2019 10:15 AM    PLATELET 794 74/21/2659 10:15 AM     Lab Results   Component Value Date/Time    Sodium 142 03/21/2019 10:15 AM    Potassium 3.9 03/21/2019 10:15 AM    Chloride 108 03/21/2019 10:15 AM    CO2 26 03/21/2019 10:15 AM    Glucose 98 03/21/2019 10:15 AM    BUN 12 03/21/2019 10:15 AM    Creatinine 0.91 03/21/2019 10:15 AM     Lab Results   Component Value Date/Time    Cholesterol, total 174 03/21/2019 10:15 AM    HDL Cholesterol 57 03/21/2019 10:15 AM    LDL, calculated 97.8 03/21/2019 10:15 AM    Triglyceride 96 03/21/2019 10:15 AM     No results found for: GPT    Previous labs    ASSESSMENT/PLAN:    1. Benign hypertension without CHF: not well controlled because he missed some dosages of blood pressure medication and she just get off work. Continue the HCTZ and metoprolol, diet and exercise.  Pt told to make sure to take her blood pressure medications every day. 2. Patient verbalized understanding and agreement with the plan. 3. Patient was given an after-visit summary. 4.   Follow-up and Dispositions    Return in about 3 months (around 8/22/2019) for f/u HTN  or sooner if worsening symptoms.   ·              Shady Adler M.D.

## 2022-10-30 NOTE — PROGRESS NOTES
Please let pt know that thyroid ultrasound showed:    1) cystic and solid mass on left thyroid lobe that needs further evaluation. 2) right thyroid nodule which does not need further workup. 3) left thyroid nodule that is benign. 4) CT neck also showed some mild rightward trachel deviation. 5) Will refer to ENT for further evaluation. This will need to be ASAP appt. 6) Will discuss further at 3/21/19 OV. Called pt and left message on VM to call the office back at  regarding her Chest CT and ultrasound results. 4) Will refer ENT. Skin normal color for race, warm, dry and intact. No evidence of rash.